# Patient Record
(demographics unavailable — no encounter records)

---

## 2025-06-09 NOTE — DISCUSSION/SUMMARY
[FreeTextEntry1] : 43 yo man with HLD, elevated Lp(a), family history of premature heart disease, here for cardiovascular prevention and risk factor optimization.   #HLD: LDL at goal <100. increase rosuvastatin to 20mg for further LDL lowering.   #ASCVD risk: He will c/w high intensity exercise 3x/week and diet rich in greens.   #Elevated LFT: US abd in 2021 without evidence of fatty liver. LFTs normalized now.   Follow up 1 year.

## 2025-06-09 NOTE — END OF VISIT
[FreeTextEntry3] : Patient seen and examined. Case discussed with preventive cardiology fellow. Agree with plan as detailed above.  Erivedge Counseling- I discussed with the patient the risks of Erivedge including but not limited to nausea, vomiting, diarrhea, constipation, weight loss, changes in the sense of taste, decreased appetite, muscle spasms, and hair loss.  The patient verbalized understanding of the proper use and possible adverse effects of Erivedge.  All of the patient's questions and concerns were addressed.

## 2025-06-09 NOTE — END OF VISIT
[FreeTextEntry3] : Patient seen and examined. Case discussed with preventive cardiology fellow. Agree with plan as detailed above.